# Patient Record
Sex: FEMALE | Race: WHITE | Employment: FULL TIME | URBAN - METROPOLITAN AREA
[De-identification: names, ages, dates, MRNs, and addresses within clinical notes are randomized per-mention and may not be internally consistent; named-entity substitution may affect disease eponyms.]

---

## 2024-10-10 ENCOUNTER — OFFICE VISIT (OUTPATIENT)
Age: 44
End: 2024-10-10

## 2024-10-10 VITALS
HEIGHT: 66 IN | SYSTOLIC BLOOD PRESSURE: 147 MMHG | WEIGHT: 195.2 LBS | RESPIRATION RATE: 18 BRPM | DIASTOLIC BLOOD PRESSURE: 99 MMHG | BODY MASS INDEX: 31.37 KG/M2 | TEMPERATURE: 98.4 F | OXYGEN SATURATION: 97 % | HEART RATE: 81 BPM

## 2024-10-10 DIAGNOSIS — R03.0 ELEVATED BLOOD PRESSURE READING: ICD-10-CM

## 2024-10-10 DIAGNOSIS — R07.89 FEELING OF CHEST TIGHTNESS: ICD-10-CM

## 2024-10-10 DIAGNOSIS — R05.1 ACUTE COUGH: Primary | ICD-10-CM

## 2024-10-10 RX ORDER — ALBUTEROL SULFATE 0.83 MG/ML
2.5 SOLUTION RESPIRATORY (INHALATION) ONCE
Status: COMPLETED | OUTPATIENT
Start: 2024-10-10 | End: 2024-10-10

## 2024-10-10 RX ORDER — ALBUTEROL SULFATE 0.83 MG/ML
2.5 SOLUTION RESPIRATORY (INHALATION) 4 TIMES DAILY PRN
Qty: 120 EACH | Refills: 0 | Status: SHIPPED | OUTPATIENT
Start: 2024-10-10

## 2024-10-10 RX ORDER — SULFASALAZINE 500 MG/1
500 TABLET ORAL 2 TIMES DAILY
COMMUNITY

## 2024-10-10 RX ORDER — SERTRALINE HYDROCHLORIDE 25 MG/1
25 TABLET, FILM COATED ORAL DAILY
COMMUNITY

## 2024-10-10 RX ORDER — AMOXICILLIN 500 MG/1
500 CAPSULE ORAL 3 TIMES DAILY
COMMUNITY

## 2024-10-10 RX ORDER — BENZONATATE 100 MG/1
100 CAPSULE ORAL 3 TIMES DAILY PRN
Qty: 30 CAPSULE | Refills: 0 | Status: SHIPPED | OUTPATIENT
Start: 2024-10-10 | End: 2024-10-20

## 2024-10-10 RX ADMIN — ALBUTEROL SULFATE 2.5 MG: 0.83 SOLUTION RESPIRATORY (INHALATION) at 14:42

## 2024-10-10 ASSESSMENT — ENCOUNTER SYMPTOMS
EYES NEGATIVE: 1
SINUS PAIN: 1
GASTROINTESTINAL NEGATIVE: 1
WHEEZING: 1
SINUS PRESSURE: 1
COUGH: 1
CHEST TIGHTNESS: 1

## 2024-10-10 NOTE — PROGRESS NOTES
10/10/2024   Juliana Alonzo (: 1980) is a 44 y.o. female, New patient, here for evaluation of the following chief complaint(s):  Cough (Onset of symptoms since , had telehealth on  dx with URI, placed on steroids and Amoxicillin, has not helped. )     ASSESSMENT/PLAN:  Below is the assessment and plan developed based on review of pertinent history, physical exam, labs, studies, and medications.  1. Acute cough  -     XR CHEST (2 VIEWS); Future  -     benzonatate (TESSALON) 100 MG capsule; Take 1 capsule by mouth 3 times daily as needed for Cough, Disp-30 capsule, R-0Normal  2. Feeling of chest tightness  -     albuterol (PROVENTIL) (2.5 MG/3ML) 0.083% nebulizer solution 2.5 mg; 2.5 mg, Nebulization, ONCE, 1 dose, On Thu 10/10/24 at 1445Initiate RT Bronchodilator Protocol: No  3. Elevated blood pressure reading  -     Ambulatory referral to Internal Medicine         X-ray results negative. Patient notified of results. Take benzonatate and albuterol as prescribed. Patient will complete antibiotic as prescribed by telehealth provider. Handout given with care instructions  2. OTC mucinex for congestion. Increase fluid intake, ensure adequate nutritional intake.  3. Follow up with PCP as needed.  4. Go to ED with development of any acute symptoms.   5. BP in office elevated. Referral placed to f/u with PCP for further evaluation. Provided with handout for DASH diet.    Follow up:    Follow up immediately for any new, worsening or changes or if symptoms are not improving over the next 5-7 days.     SUBJECTIVE/OBJECTIVE:  Patient here today with c/o sore throat and cough that has been ongoing for approx four days. Reports that she had a telehealth appt that was scheduled through her insurance company and was diagnosed with a URI. Reports that she was prescribed amoxicillin, prednisone and an inhaler. Reports that her symptoms have worsened (worsening cough, sinus pressure) since starting on

## 2025-03-12 ENCOUNTER — OFFICE VISIT (OUTPATIENT)
Age: 45
End: 2025-03-12

## 2025-03-12 VITALS
RESPIRATION RATE: 18 BRPM | SYSTOLIC BLOOD PRESSURE: 132 MMHG | TEMPERATURE: 98.1 F | OXYGEN SATURATION: 98 % | HEART RATE: 84 BPM | DIASTOLIC BLOOD PRESSURE: 84 MMHG

## 2025-03-12 DIAGNOSIS — R05.1 ACUTE COUGH: ICD-10-CM

## 2025-03-12 DIAGNOSIS — U07.1 COVID-19: Primary | ICD-10-CM

## 2025-03-12 LAB
INFLUENZA A ANTIGEN, POC: NEGATIVE
INFLUENZA B ANTIGEN, POC: NEGATIVE
Lab: ABNORMAL
PERFORMING INSTRUMENT: ABNORMAL
QC PASS/FAIL: ABNORMAL
SARS-COV-2, POC: DETECTED

## 2025-03-12 NOTE — PROGRESS NOTES
3/12/2025   Juliana Alonzo (: 1980) is a 44 y.o. female, established patient, here for evaluation of the following chief complaint(s):  Cold Symptoms (Pt presents with headaches, sore throat, coughing/chest congestion, and body aches since last night)     ASSESSMENT/PLAN:  Below is the assessment and plan developed based on review of pertinent history, physical exam, labs, studies, and medications.  1. COVID-19  2. Acute cough  -     POCT Influenza A/B Antigen  -     POCT COVID-19, Antigen    Patient tested positive for COVID today  Strict 5 day quarantine from onset of symptoms, additional 5 days in a well fitting mask  Discussed Paxlovid with patient today.  Patient declines prescription for Paxlovid  Offered to prescribe Tessalon, patient declined  Lots of fluids, plenty of rest  OTC plain Mucinex to help thin secretions  Hot tea with honey, saline sinus rinses, throat lozenges  Tylenol for fevers, chills, aches and pain  Follow up with PCP if symptoms persist or worsen  Go to ED if you develop any shortness of breath, chest pain or if you are unable to tolerate food or fluids     Handout given with care instructions  2. OTC for symptom management. Increase fluid intake, ensure adequate nutritional intake.  3. Follow up with PCP as needed.  4. Go to ED with development of any acute symptoms.     Follow up:    Follow up immediately for any new, worsening or changes or if symptoms are not improving over the next 5-7 days.     SUBJECTIVE/OBJECTIVE:  44-year-old patient here today with complaint of cough, sore throat, congestion, body aches, and headache that started yesterday.  Taking OTC Mucinex for symptoms.  Denies any fever, nausea, or vomiting.  No complaint of shortness of breath or wheezing.         Cold Symptoms (Pt presents with headaches, sore throat, coughing/chest congestion, and body aches since last night)      Results for orders placed or performed in visit on 25   POCT Influenza A/B

## 2025-03-12 NOTE — PATIENT INSTRUCTIONS
You have tested positive for COVID today  Strict 5 day quarantine from onset of symptoms, additional 5 days in a well fitting mask  Lots of fluids, plenty of rest  OTC plain Mucinex to help thin secretions  Hot tea with honey, saline sinus rinses, throat lozenges  Tylenol for fevers, chills, aches and pain  Follow up with PCP if symptoms persist or worsen  Go to ED if you develop any shortness of breath, chest pain or if you are unable to tolerate food or fluids